# Patient Record
Sex: FEMALE | Race: OTHER | Employment: PART TIME
[De-identification: names, ages, dates, MRNs, and addresses within clinical notes are randomized per-mention and may not be internally consistent; named-entity substitution may affect disease eponyms.]

---

## 2023-10-03 ENCOUNTER — HOSPITAL ENCOUNTER (OUTPATIENT)
Facility: HOSPITAL | Age: 48
Discharge: HOME | End: 2023-10-06

## 2023-10-03 ENCOUNTER — OFFICE VISIT (OUTPATIENT)
Age: 48
End: 2023-10-03

## 2023-10-03 DIAGNOSIS — Z12.31 VISIT FOR SCREENING MAMMOGRAM: ICD-10-CM

## 2023-10-03 DIAGNOSIS — Z01.419 ENCOUNTER FOR WELL WOMAN EXAM: Primary | ICD-10-CM

## 2023-10-03 PROCEDURE — 77063 BREAST TOMOSYNTHESIS BI: CPT

## 2023-10-03 NOTE — PROGRESS NOTES
Assessment/Plan:    Aly Dorantes was seen today for well woman visit. Diagnoses and all orders for this visit:    Encounter for well woman exam        No follow-up provider specified. ERENDIRA Silva expressed understanding of this plan. An AVS was printed and given to the patient.      ----------------------------------------------------------------------    Chief Complaint   Patient presents with    Well Woman Visit     [address] [facility] [employer] - for screening mammogram        History of Present Illness:    [facility] annual well woman exam  She has no breast concerns or complaints  She had her \"pap yesterday\" but can't recall the name of the clinic. We explained the services we provide through [facility] do include a pap as indicated  She is    Having irregular periods in the past year, might miss a month   No risk for DV     No past medical history on file. Current Outpatient Medications   Medication Sig Dispense Refill    dicyclomine (BENTYL) 20 MG tablet Take 20 mg by mouth 3 times daily as needed (Patient not taking: Reported on 10/3/2023)       No current facility-administered medications for this visit.        Allergies   Allergen Reactions    Eggs Or Egg-Derived Products Itching    Shellfish-Derived Products Itching       Social History     Tobacco Use    Smoking status: Never    Smokeless tobacco: Never   Substance Use Topics    Alcohol use: No    Drug use: No       Family History   Problem Relation Age of Onset    No Known Problems Mother        Physical Exam:     LMP 2023 (Approximate) Comment: having irregular periods since the beggining of the year    A&Ox3  [facility] NAD  Respirations normal and non labored  Breast exam- chau neg for mass, tenderness, skin color changes, dimpling or retractions

## 2023-10-04 NOTE — PROGRESS NOTES
[facility]       No Yes Comments   Has a doctor ever seen or felt anything wrong with your breast? []                                  [x]                                  Had Dx imaging 3/23/2022 resulted benign- lymph node   Have you ever had a breast biopsy? [x]                                  []                                          When and where was last mammogram performed? 3/23/2022     Have you ever been told that there was a problem on your mammogram?   No Yes Comments   [x]                                  []                                       Do you have breast implants? No Yes Comments   [x]                                  []                                       When was your last Pap test performed? 10/2/2023- Pt is not able to provide me with Medical provider or clinic information. But patient states she had it done yesterday. Have you ever had an abnormal Pap test?   No Yes Comments   [x]                                  []                                       Have you had a hysterectomy? No Yes Comments (why)   []                                  []                                       Have you been through menopause? No Yes Date of LMP   [x]                                  []                                  8/14/2023- having irregular periods since the beginning of this year. Did your mother take BRENDA? No Yes Unknown   [x]                                  []                                       Do you have a history of HIV exposure? No Yes    [x]                                  []                                       Have you ever been diagnosed with any type of Cancer   No Yes Comments (type,when,where,type of treatment   [x]                                  []                                          Has a family member been diagnosed with breast or ovarian cancer?    No Yes Comments (which family members, and type   [x]

## 2024-12-03 ENCOUNTER — HOSPITAL ENCOUNTER (OUTPATIENT)
Facility: HOSPITAL | Age: 49
Discharge: HOME | End: 2024-12-06

## 2024-12-03 ENCOUNTER — OFFICE VISIT (OUTPATIENT)
Age: 49
End: 2024-12-03

## 2024-12-03 DIAGNOSIS — Z12.31 VISIT FOR SCREENING MAMMOGRAM: ICD-10-CM

## 2024-12-03 DIAGNOSIS — Z01.419 ENCOUNTER FOR WELL WOMAN EXAM: Primary | ICD-10-CM

## 2024-12-03 PROCEDURE — 77063 BREAST TOMOSYNTHESIS BI: CPT

## 2024-12-03 NOTE — PROGRESS NOTES
[facility] HISTORY QUESTIONNAIRE     used  [] No    [x]   Yes    Ht--5'5    Wt--160 lbs    Do you have any breast concerns today?  no    Are you experiencing any of the following?   No Yes Comments   Nipple Discharge [x]                                  []                                     Breast Lump/Masses [x]                                  []                                     Breast Skin Changes [x]                                  []                                           When and where was last mammogram performed?  [facility] 10/3/2023     No Yes Comments   Have you ever had a mammogram that required additional follow up?  [x]     []        Have you ever had a breast biopsy? [x]                                  []                                     Do you have breast implants?  [x]        []                When and where was your last Pap test performed? 10/2/2023 and was done with a clinic that she can not remember.  Declined [facility] pap services.     Have you ever had an abnormal Pap test? ( Please note, if Hx of Colposcopy, LEEP, CKC, LUKASZ 2 or 3)  No Yes Comments   [x]                                  []                                       Have you been through menopause?   No Yes Date of LMP   []                                  [x]                                  January 2023 was last period.      For patients who are still menstruating: Do you use any form of family planning?    Have you had a hysterectomy?   No Yes Comments (why)   [x]                                  []                                        No Yes Comments   Vaginal Discharge [x]                                  []                                     Abnormal/unusual vaginal bleeding    (Post menopausal  [x]                                  []                                       Did your mother take BRENDA?  No Yes Unknown   []                                  []

## 2024-12-03 NOTE — PROGRESS NOTES
Assessment/Plan:    Holli was seen today for annual exam.    Diagnoses and all orders for this visit:    Encounter for well woman exam        No follow-up provider specified.    Torri Stevenson PA-C  MarivelAries Lujan expressed understanding of this plan. An AVS was printed and given to the patient.      ----------------------------------------------------------------------    Chief Complaint   Patient presents with    Annual Exam     [facility]         History of Present Illness:  [facility] annual well woman visit     UTD on pap through [facility]  No risk for DV  No breast concerns or complaints      No past medical history on file.    Current Outpatient Medications   Medication Sig Dispense Refill    dicyclomine (BENTYL) 20 MG tablet Take 20 mg by mouth 3 times daily as needed (Patient not taking: Reported on 10/3/2023)       No current facility-administered medications for this visit.       Allergies   Allergen Reactions    Egg-Derived Products Itching    Shellfish-Derived Products Itching       Social History     Tobacco Use    Smoking status: Never    Smokeless tobacco: Never   Substance Use Topics    Alcohol use: No    Drug use: No       Family History   Problem Relation Age of Onset    No Known Problems Mother        Physical Exam:     LMP 2023 (Approximate) Comment: having irregular periods since the beggining of the year    A&Ox3  [facility] NAD  Respirations normal and non labored    Breast exam- chau neg for mass, tenderness, skin color changes, dimpling or retractions

## 2024-12-04 ENCOUNTER — TELEPHONE (OUTPATIENT)
Age: 49
End: 2024-12-04

## 2024-12-04 DIAGNOSIS — R92.8 ABNORMAL MAMMOGRAM OF RIGHT BREAST: Primary | ICD-10-CM

## 2024-12-04 NOTE — TELEPHONE ENCOUNTER
The  services used. PT needs additional images, called pt but no answer but I was able to leave a vm.ANANDA RUIZ RN

## 2024-12-26 ENCOUNTER — TELEPHONE (OUTPATIENT)
Age: 49
End: 2024-12-26

## 2024-12-26 NOTE — TELEPHONE ENCOUNTER
Called patient in re: appt. Reminder for additional breast imaging. Pt answered the phone and stated that she will be going to her appt. Paradise Méndez RN

## 2024-12-30 ENCOUNTER — HOSPITAL ENCOUNTER (OUTPATIENT)
Facility: HOSPITAL | Age: 49
Discharge: HOME | End: 2025-01-02

## 2024-12-30 DIAGNOSIS — R92.8 ABNORMAL MAMMOGRAM OF RIGHT BREAST: ICD-10-CM

## 2024-12-30 PROCEDURE — 76642 ULTRASOUND BREAST LIMITED: CPT

## 2024-12-30 PROCEDURE — G0279 TOMOSYNTHESIS, MAMMO: HCPCS

## 2025-01-02 DIAGNOSIS — R92.8 ABNORMAL MAMMOGRAM OF RIGHT BREAST: Primary | ICD-10-CM

## 2025-01-30 ENCOUNTER — TELEPHONE (OUTPATIENT)
Age: 50
End: 2025-01-30

## 2025-01-30 NOTE — TELEPHONE ENCOUNTER
Patient called as she was confused about how to get her June PB follow up imaging. I explained to her that I will be back in touch end of March/April to verify her income and then we will be getting her scheduled closer to when she is due in June. ELIZABETH ROSS RN

## 2025-06-05 ENCOUNTER — HOSPITAL ENCOUNTER (OUTPATIENT)
Facility: HOSPITAL | Age: 50
Discharge: HOME | End: 2025-06-08

## 2025-06-05 DIAGNOSIS — R92.8 ABNORMAL MAMMOGRAM OF RIGHT BREAST: ICD-10-CM

## 2025-06-05 PROCEDURE — G0279 TOMOSYNTHESIS, MAMMO: HCPCS

## 2025-06-05 PROCEDURE — 76642 ULTRASOUND BREAST LIMITED: CPT

## 2025-06-06 ENCOUNTER — RESULTS FOLLOW-UP (OUTPATIENT)
Age: 50
End: 2025-06-06